# Patient Record
Sex: FEMALE | URBAN - METROPOLITAN AREA
[De-identification: names, ages, dates, MRNs, and addresses within clinical notes are randomized per-mention and may not be internally consistent; named-entity substitution may affect disease eponyms.]

---

## 2017-05-03 NOTE — PATIENT DISCUSSION
OCULAR HYPERTENSION, OU:   INTRAOCULAR PRESSURE WNL, OU. CONTINUE ALPHAGAN BID OS______. RETURN FOR FOLLOW-UP AS SCHEDULED.

## 2018-05-08 NOTE — PATIENT DISCUSSION
OCULAR HYPERTENSION, OU: INTRAOCULAR PRESSURE WNL, OU. DISCONTINUE ALPHAGAN BID OS AND START LATANOPROST QHS OS. RETURN FOR FOLLOW-UP AS SCHEDULED.

## 2018-05-08 NOTE — PATIENT DISCUSSION
OHTN Counseling: I have explained to the patient at length the diagnosis of ocular hypertension and its pathophysiology. The presence of elevated intraocular pressure without detectable visual field loss and/or optic nerve damage was discussed with the patient. I have discussed the risks, benefits, and alternatives of treatment as well as the risk factors for glaucoma. The patient understands and agrees with close observation. Return for follow-up as scheduled.

## 2018-05-30 ENCOUNTER — IMPORTED ENCOUNTER (OUTPATIENT)
Dept: URBAN - METROPOLITAN AREA CLINIC 43 | Facility: CLINIC | Age: 78
End: 2018-05-30

## 2018-05-30 PROBLEM — H26.493: Noted: 2018-05-30

## 2018-05-30 PROBLEM — H43.813: Noted: 2018-05-30

## 2018-08-22 NOTE — PATIENT DISCUSSION
OCULAR HYPERTENSION, OU:  IMPROVED INTRAOCULAR PRESSURE, OU. LATANOPROST QHS, OS______. RETURN FOR FOLLOW-UP AS SCHEDULED.

## 2019-02-06 NOTE — PATIENT DISCUSSION
New Prescription: dorzolamide-timolol (dorzolamide-timolol): drops: 2-0.5% 1 drop twice a day into left eye 02-

## 2019-02-06 NOTE — PATIENT DISCUSSION
continue latanoprost qhs OS RECOMMENDED ADDING ANOTHER DROP COSOPT BID OS ONLY  _____. RETURN FOR FOLLOW-UP AS SCHEDULED.

## 2019-03-18 NOTE — PATIENT DISCUSSION
Continue: dorzolamide-timolol (dorzolamide-timolol): drops: 2-0.5% 1 drop twice a day into left eye 02-

## 2019-03-18 NOTE — PATIENT DISCUSSION
POAG, OU:  INTRAOCULAR PRESSURE IS WITHIN ACCEPTABLE LIMITS. PATIENT INSTRUCTED TO CONTINUE _both drops______ AND RETURN FOR FOLLOW-UP AS SCHEDULED.

## 2019-07-22 ENCOUNTER — IMPORTED ENCOUNTER (OUTPATIENT)
Dept: URBAN - METROPOLITAN AREA CLINIC 43 | Facility: CLINIC | Age: 79
End: 2019-07-22

## 2019-07-22 PROBLEM — H26.493: Noted: 2019-07-22

## 2019-07-22 PROBLEM — H02.834: Noted: 2019-07-22

## 2019-07-22 PROBLEM — H02.831: Noted: 2019-07-22

## 2019-07-22 PROBLEM — H43.813: Noted: 2019-07-22

## 2019-08-29 ENCOUNTER — PREPPED CHART (OUTPATIENT)
Dept: URBAN - METROPOLITAN AREA CLINIC 32 | Facility: CLINIC | Age: 79
End: 2019-08-29

## 2019-08-29 ENCOUNTER — IMPORTED ENCOUNTER (OUTPATIENT)
Dept: URBAN - METROPOLITAN AREA CLINIC 43 | Facility: CLINIC | Age: 79
End: 2019-08-29

## 2019-08-29 PROBLEM — H26.493: Noted: 2019-08-29

## 2019-08-29 PROBLEM — H43.813: Noted: 2019-08-29

## 2019-09-23 NOTE — PATIENT DISCUSSION
OCULAR HYPERTENSION, OU:  NORMAL INTRAOCULAR PRESSURE, OU. CONTINUE BOTH DROPS___. RETURN FOR FOLLOW-UP AS SCHEDULED.

## 2020-01-02 ASSESSMENT — TONOMETRY
OS_IOP_MMHG: 11
OD_IOP_MMHG: 13

## 2020-01-02 ASSESSMENT — VISUAL ACUITY
OS_SC: 20/25-1
OS_CC: 20/20
OD_SC: 20/70+1
OD_CC: 20/30+2

## 2020-01-03 ENCOUNTER — EST. PATIENT EMERGENCY (OUTPATIENT)
Dept: URBAN - METROPOLITAN AREA CLINIC 32 | Facility: CLINIC | Age: 80
End: 2020-01-03

## 2020-01-03 DIAGNOSIS — H04.221: ICD-10-CM

## 2020-01-03 DIAGNOSIS — H43.813: ICD-10-CM

## 2020-01-03 PROCEDURE — 68840 EXPLORE/IRRIGATE TEAR DUCTS: CPT

## 2020-01-03 PROCEDURE — 92012 INTRM OPH EXAM EST PATIENT: CPT

## 2020-01-03 PROCEDURE — 92134 CPTRZ OPH DX IMG PST SGM RTA: CPT

## 2020-01-03 ASSESSMENT — VISUAL ACUITY
OD_PH: 20/30+2
OD_SC: 20/40-2
OS_SC: 20/40+2

## 2020-01-03 ASSESSMENT — TONOMETRY
OS_IOP_MMHG: 11
OD_IOP_MMHG: 12

## 2020-02-20 ENCOUNTER — ESTABLISHED COMPREHENSIVE EXAM (OUTPATIENT)
Dept: URBAN - METROPOLITAN AREA CLINIC 32 | Facility: CLINIC | Age: 80
End: 2020-02-20

## 2020-02-20 DIAGNOSIS — H04.223: ICD-10-CM

## 2020-02-20 DIAGNOSIS — H43.813: ICD-10-CM

## 2020-02-20 PROCEDURE — 92015 DETERMINE REFRACTIVE STATE: CPT

## 2020-02-20 PROCEDURE — 92014 COMPRE OPH EXAM EST PT 1/>: CPT

## 2020-02-20 ASSESSMENT — VISUAL ACUITY
OU_SC: J1+
OU_CC: J1+
OD_SC: 20/20
OS_SC: 20/25

## 2020-02-20 ASSESSMENT — KERATOMETRY
OS_AXISANGLE_DEGREES: 180
OD_K2POWER_DIOPTERS: 44.25
OD_AXISANGLE2_DEGREES: 115
OS_AXISANGLE2_DEGREES: 90
OD_K1POWER_DIOPTERS: 43.75
OS_K2POWER_DIOPTERS: 45
OD_AXISANGLE_DEGREES: 25
OS_K1POWER_DIOPTERS: 44

## 2020-02-20 ASSESSMENT — TONOMETRY
OD_IOP_MMHG: 11
OS_IOP_MMHG: 11

## 2020-03-23 NOTE — PATIENT DISCUSSION
OCULAR HYPERTENSION, OU:  NORMAL INTRAOCULAR PRESSURE, OU. CONTINUE _LATANOPROST_AND DORZOLAMIDE ___. RETURN FOR FOLLOW-UP AS SCHEDULED.

## 2020-04-19 ASSESSMENT — VISUAL ACUITY
OD_CC: 20/30+2
OS_SC: J5
OS_SC: 20/30-2
OD_CC: 20/25
OS_SC: 20/25-1
OD_SC: 20/70+1
OS_SC: 20/30
OD_OTHER: 20/50.
OS_CC: 20/40 +2
OD_CC: J1+
OD_CC: 20/30 -2
OS_OTHER: 20/50.
OD_SC: J3
OD_SC: 20/30-2
OS_CC: 20/30
OD_SC: 20/50
OS_CC: J1+
OS_CC: 20/20

## 2020-04-19 ASSESSMENT — TONOMETRY
OD_IOP_MMHG: 11.0
OD_IOP_MMHG: 13.0
OS_IOP_MMHG: 11.0
OS_IOP_MMHG: 12.0
OS_IOP_MMHG: 14.0
OD_IOP_MMHG: 13.0

## 2020-04-19 ASSESSMENT — KERATOMETRY
OS_K1POWER_DIOPTERS: 44.75
OS_AXISANGLE2_DEGREES: 116
OD_K2POWER_DIOPTERS: 43.75
OD_K1POWER_DIOPTERS: 44.25
OD_AXISANGLE2_DEGREES: 99
OS_K2POWER_DIOPTERS: 43.75
OS_AXISANGLE_DEGREES: 26
OD_AXISANGLE_DEGREES: 9

## 2020-07-23 ENCOUNTER — ESTABLISHED COMPREHENSIVE EXAM (OUTPATIENT)
Dept: URBAN - METROPOLITAN AREA CLINIC 32 | Facility: CLINIC | Age: 80
End: 2020-07-23

## 2020-07-23 DIAGNOSIS — H35.363: ICD-10-CM

## 2020-07-23 DIAGNOSIS — H04.223: ICD-10-CM

## 2020-07-23 PROCEDURE — 92014 COMPRE OPH EXAM EST PT 1/>: CPT

## 2020-07-23 PROCEDURE — 92250 FUNDUS PHOTOGRAPHY W/I&R: CPT

## 2020-07-23 ASSESSMENT — KERATOMETRY
OD_K1POWER_DIOPTERS: 43.75
OS_AXISANGLE_DEGREES: 180
OD_K2POWER_DIOPTERS: 44.25
OS_K1POWER_DIOPTERS: 44
OS_AXISANGLE2_DEGREES: 90
OD_AXISANGLE2_DEGREES: 115
OS_K2POWER_DIOPTERS: 45
OD_AXISANGLE_DEGREES: 25

## 2020-07-23 ASSESSMENT — TONOMETRY
OD_IOP_MMHG: 12
OS_IOP_MMHG: 11

## 2020-07-23 ASSESSMENT — VISUAL ACUITY
OS_CC: 20/20
OS_SC: 20/20-1
OD_SC: 20/40
OD_CC: 20/20
OU_CC: J1+

## 2020-09-29 NOTE — PATIENT DISCUSSION
OCULAR HYPERTENSION, OU:  ACCEPTABLE INTRAOCULAR PRESSURE, OU. CONTINUE BOTH DROPS OS______. RETURN FOR FOLLOW-UP AS SCHEDULED.

## 2021-03-16 NOTE — PATIENT DISCUSSION
POAG, OU:  INTRAOCULAR PRESSURE IS WITHIN ACCEPTABLE LIMITS. PATIENT INSTRUCTED TO CONTINUE LTN AND DORZOL/TIM_______ AND RETURN FOR FOLLOW-UP AS SCHEDULED.

## 2021-07-29 ENCOUNTER — ESTABLISHED COMPREHENSIVE EXAM (OUTPATIENT)
Dept: URBAN - METROPOLITAN AREA CLINIC 32 | Facility: CLINIC | Age: 81
End: 2021-07-29

## 2021-07-29 DIAGNOSIS — H04.223: ICD-10-CM

## 2021-07-29 DIAGNOSIS — H35.363: ICD-10-CM

## 2021-07-29 DIAGNOSIS — H04.123: ICD-10-CM

## 2021-07-29 PROCEDURE — 92015 DETERMINE REFRACTIVE STATE: CPT

## 2021-07-29 PROCEDURE — 92014 COMPRE OPH EXAM EST PT 1/>: CPT

## 2021-07-29 PROCEDURE — 92134 CPTRZ OPH DX IMG PST SGM RTA: CPT

## 2021-07-29 ASSESSMENT — KERATOMETRY
OS_K2POWER_DIOPTERS: 45
OD_AXISANGLE2_DEGREES: 115
OD_K2POWER_DIOPTERS: 44.25
OS_K1POWER_DIOPTERS: 44
OS_AXISANGLE_DEGREES: 180
OD_K1POWER_DIOPTERS: 43.75
OD_AXISANGLE_DEGREES: 25
OS_AXISANGLE2_DEGREES: 90

## 2021-07-29 ASSESSMENT — VISUAL ACUITY
OS_CC: J1+
OD_SC: 20/50+2
OS_SC: 20/40+2
OD_CC: J1+
OS_CC: 20/25
OD_CC: 20/25

## 2021-07-29 ASSESSMENT — TONOMETRY
OS_IOP_MMHG: 11
OD_IOP_MMHG: 11

## 2022-01-14 ENCOUNTER — CONTACT LENSES/GLASSES VISIT (OUTPATIENT)
Dept: URBAN - METROPOLITAN AREA CLINIC 32 | Facility: CLINIC | Age: 82
End: 2022-01-14

## 2022-01-14 DIAGNOSIS — H52.203: ICD-10-CM

## 2022-01-14 PROCEDURE — 92015 DETERMINE REFRACTIVE STATE: CPT

## 2022-01-14 ASSESSMENT — KERATOMETRY
OS_AXISANGLE2_DEGREES: 90
OD_AXISANGLE_DEGREES: 25
OD_K1POWER_DIOPTERS: 43.75
OS_K2POWER_DIOPTERS: 45
OD_K2POWER_DIOPTERS: 44.25
OD_AXISANGLE2_DEGREES: 115
OS_AXISANGLE_DEGREES: 180
OS_K1POWER_DIOPTERS: 44

## 2022-01-14 ASSESSMENT — VISUAL ACUITY
OD_SC: 20/40-2
OU_SC: 20/25-1
OS_SC: 20/25-2

## 2023-05-31 ENCOUNTER — COMPREHENSIVE EXAM (OUTPATIENT)
Dept: URBAN - METROPOLITAN AREA CLINIC 32 | Facility: CLINIC | Age: 83
End: 2023-05-31

## 2023-05-31 DIAGNOSIS — H04.123: ICD-10-CM

## 2023-05-31 DIAGNOSIS — H43.813: ICD-10-CM

## 2023-05-31 DIAGNOSIS — H35.363: ICD-10-CM

## 2023-05-31 DIAGNOSIS — H04.223: ICD-10-CM

## 2023-05-31 PROCEDURE — 92250 FUNDUS PHOTOGRAPHY W/I&R: CPT

## 2023-05-31 PROCEDURE — 99214 OFFICE O/P EST MOD 30 MIN: CPT

## 2023-05-31 PROCEDURE — 92134 CPTRZ OPH DX IMG PST SGM RTA: CPT

## 2023-05-31 ASSESSMENT — KERATOMETRY
OS_K1POWER_DIOPTERS: 45.00
OS_AXISANGLE2_DEGREES: 11
OD_AXISANGLE_DEGREES: 104
OS_AXISANGLE_DEGREES: 101
OD_K2POWER_DIOPTERS: 44.00
OS_K2POWER_DIOPTERS: 43.75
OD_K1POWER_DIOPTERS: 44.75
OD_AXISANGLE2_DEGREES: 14

## 2023-05-31 ASSESSMENT — TONOMETRY
OD_IOP_MMHG: 10
OS_IOP_MMHG: 11

## 2023-05-31 ASSESSMENT — VISUAL ACUITY
OS_SC: 20/30
OD_SC: J1
OD_SC: 20/50
OS_CC: 20/25
OS_SC: J1
OD_CC: J1+
OD_CC: 20/25
OS_CC: J1-1

## 2024-06-24 ENCOUNTER — COMPREHENSIVE EXAM (OUTPATIENT)
Dept: URBAN - METROPOLITAN AREA CLINIC 32 | Facility: CLINIC | Age: 84
End: 2024-06-24

## 2024-06-24 DIAGNOSIS — H35.363: ICD-10-CM

## 2024-06-24 DIAGNOSIS — H04.123: ICD-10-CM

## 2024-06-24 DIAGNOSIS — H01.00A: ICD-10-CM

## 2024-06-24 DIAGNOSIS — H43.813: ICD-10-CM

## 2024-06-24 DIAGNOSIS — Z96.1: ICD-10-CM

## 2024-06-24 DIAGNOSIS — H01.00B: ICD-10-CM

## 2024-06-24 PROCEDURE — 92015 DETERMINE REFRACTIVE STATE: CPT

## 2024-06-24 PROCEDURE — 99214 OFFICE O/P EST MOD 30 MIN: CPT

## 2024-06-24 PROCEDURE — 92134 CPTRZ OPH DX IMG PST SGM RTA: CPT

## 2024-06-24 ASSESSMENT — KERATOMETRY
OS_AXISANGLE2_DEGREES: 11
OS_AXISANGLE_DEGREES: 101
OD_K1POWER_DIOPTERS: 44.75
OS_K1POWER_DIOPTERS: 45.00
OS_K2POWER_DIOPTERS: 43.75
OD_K2POWER_DIOPTERS: 44.00
OD_AXISANGLE_DEGREES: 104
OD_AXISANGLE2_DEGREES: 14

## 2024-06-24 ASSESSMENT — TONOMETRY
OD_IOP_MMHG: 11
OS_IOP_MMHG: 12

## 2024-06-24 ASSESSMENT — VISUAL ACUITY
OS_SC: J1
OS_CC: 20/25
OD_SC: 20/40-2
OS_SC: 20/25
OD_SC: J1+
OD_CC: 20/30+2

## 2025-06-26 ENCOUNTER — COMPREHENSIVE EXAM (OUTPATIENT)
Age: 85
End: 2025-06-26

## 2025-06-26 DIAGNOSIS — H35.363: ICD-10-CM

## 2025-06-26 DIAGNOSIS — H04.123: ICD-10-CM

## 2025-06-26 DIAGNOSIS — H01.00A: ICD-10-CM

## 2025-06-26 DIAGNOSIS — H01.00B: ICD-10-CM

## 2025-06-26 DIAGNOSIS — H43.813: ICD-10-CM

## 2025-06-26 PROCEDURE — 92015 DETERMINE REFRACTIVE STATE: CPT

## 2025-06-26 PROCEDURE — 92250 FUNDUS PHOTOGRAPHY W/I&R: CPT

## 2025-06-26 PROCEDURE — 99214 OFFICE O/P EST MOD 30 MIN: CPT
